# Patient Record
Sex: FEMALE | Race: OTHER | HISPANIC OR LATINO | URBAN - METROPOLITAN AREA
[De-identification: names, ages, dates, MRNs, and addresses within clinical notes are randomized per-mention and may not be internally consistent; named-entity substitution may affect disease eponyms.]

---

## 2022-02-28 ENCOUNTER — EMERGENCY (EMERGENCY)
Facility: HOSPITAL | Age: 42
LOS: 1 days | Discharge: HOME | End: 2022-02-28
Attending: EMERGENCY MEDICINE
Payer: SELF-PAY

## 2022-02-28 VITALS
WEIGHT: 104.94 LBS | HEART RATE: 98 BPM | HEIGHT: 60 IN | RESPIRATION RATE: 18 BRPM | SYSTOLIC BLOOD PRESSURE: 105 MMHG | DIASTOLIC BLOOD PRESSURE: 48 MMHG | TEMPERATURE: 99 F | OXYGEN SATURATION: 95 %

## 2022-02-28 LAB
ALBUMIN SERPL ELPH-MCNC: 4 G/DL — SIGNIFICANT CHANGE UP (ref 3.3–5.2)
ALP SERPL-CCNC: 73 U/L — SIGNIFICANT CHANGE UP (ref 40–120)
ALT FLD-CCNC: 38 U/L — HIGH
ANION GAP SERPL CALC-SCNC: 11 MMOL/L — SIGNIFICANT CHANGE UP (ref 5–17)
AST SERPL-CCNC: 44 U/L — HIGH
BASOPHILS # BLD AUTO: 0.01 K/UL — SIGNIFICANT CHANGE UP (ref 0–0.2)
BASOPHILS NFR BLD AUTO: 0.1 % — SIGNIFICANT CHANGE UP (ref 0–2)
BILIRUB SERPL-MCNC: 0.3 MG/DL — LOW (ref 0.4–2)
BUN SERPL-MCNC: 18.1 MG/DL — SIGNIFICANT CHANGE UP (ref 8–20)
CALCIUM SERPL-MCNC: 8.3 MG/DL — LOW (ref 8.6–10.2)
CHLORIDE SERPL-SCNC: 102 MMOL/L — SIGNIFICANT CHANGE UP (ref 98–107)
CO2 SERPL-SCNC: 22 MMOL/L — SIGNIFICANT CHANGE UP (ref 22–29)
CREAT SERPL-MCNC: 0.36 MG/DL — LOW (ref 0.5–1.3)
EOSINOPHIL # BLD AUTO: 0.14 K/UL — SIGNIFICANT CHANGE UP (ref 0–0.5)
EOSINOPHIL NFR BLD AUTO: 1.6 % — SIGNIFICANT CHANGE UP (ref 0–6)
FLUAV AG NPH QL: SIGNIFICANT CHANGE UP
FLUBV AG NPH QL: SIGNIFICANT CHANGE UP
GLUCOSE SERPL-MCNC: 105 MG/DL — HIGH (ref 70–99)
HCG SERPL-ACNC: <4 MIU/ML — SIGNIFICANT CHANGE UP
HCT VFR BLD CALC: 36.8 % — SIGNIFICANT CHANGE UP (ref 34.5–45)
HGB BLD-MCNC: 12.5 G/DL — SIGNIFICANT CHANGE UP (ref 11.5–15.5)
IMM GRANULOCYTES NFR BLD AUTO: 0.3 % — SIGNIFICANT CHANGE UP (ref 0–1.5)
LIDOCAIN IGE QN: 50 U/L — SIGNIFICANT CHANGE UP (ref 22–51)
LYMPHOCYTES # BLD AUTO: 0.57 K/UL — LOW (ref 1–3.3)
LYMPHOCYTES # BLD AUTO: 6.5 % — LOW (ref 13–44)
MCHC RBC-ENTMCNC: 31.6 PG — SIGNIFICANT CHANGE UP (ref 27–34)
MCHC RBC-ENTMCNC: 34 GM/DL — SIGNIFICANT CHANGE UP (ref 32–36)
MCV RBC AUTO: 93.2 FL — SIGNIFICANT CHANGE UP (ref 80–100)
MONOCYTES # BLD AUTO: 0.45 K/UL — SIGNIFICANT CHANGE UP (ref 0–0.9)
MONOCYTES NFR BLD AUTO: 5.2 % — SIGNIFICANT CHANGE UP (ref 2–14)
NEUTROPHILS # BLD AUTO: 7.53 K/UL — HIGH (ref 1.8–7.4)
NEUTROPHILS NFR BLD AUTO: 86.3 % — HIGH (ref 43–77)
PLATELET # BLD AUTO: 252 K/UL — SIGNIFICANT CHANGE UP (ref 150–400)
POTASSIUM SERPL-MCNC: 3.9 MMOL/L — SIGNIFICANT CHANGE UP (ref 3.5–5.3)
POTASSIUM SERPL-SCNC: 3.9 MMOL/L — SIGNIFICANT CHANGE UP (ref 3.5–5.3)
PROT SERPL-MCNC: 7 G/DL — SIGNIFICANT CHANGE UP (ref 6.6–8.7)
RBC # BLD: 3.95 M/UL — SIGNIFICANT CHANGE UP (ref 3.8–5.2)
RBC # FLD: 11.9 % — SIGNIFICANT CHANGE UP (ref 10.3–14.5)
RSV RNA NPH QL NAA+NON-PROBE: SIGNIFICANT CHANGE UP
SARS-COV-2 RNA SPEC QL NAA+PROBE: SIGNIFICANT CHANGE UP
SODIUM SERPL-SCNC: 134 MMOL/L — LOW (ref 135–145)
WBC # BLD: 8.73 K/UL — SIGNIFICANT CHANGE UP (ref 3.8–10.5)
WBC # FLD AUTO: 8.73 K/UL — SIGNIFICANT CHANGE UP (ref 3.8–10.5)

## 2022-02-28 PROCEDURE — 99284 EMERGENCY DEPT VISIT MOD MDM: CPT

## 2022-02-28 PROCEDURE — 99053 MED SERV 10PM-8AM 24 HR FAC: CPT

## 2022-02-28 PROCEDURE — 87637 SARSCOV2&INF A&B&RSV AMP PRB: CPT

## 2022-02-28 PROCEDURE — 85025 COMPLETE CBC W/AUTO DIFF WBC: CPT

## 2022-02-28 PROCEDURE — 99285 EMERGENCY DEPT VISIT HI MDM: CPT | Mod: 25

## 2022-02-28 PROCEDURE — 96374 THER/PROPH/DIAG INJ IV PUSH: CPT

## 2022-02-28 PROCEDURE — 36415 COLL VENOUS BLD VENIPUNCTURE: CPT

## 2022-02-28 PROCEDURE — 80053 COMPREHEN METABOLIC PANEL: CPT

## 2022-02-28 PROCEDURE — 96361 HYDRATE IV INFUSION ADD-ON: CPT

## 2022-02-28 PROCEDURE — 83690 ASSAY OF LIPASE: CPT

## 2022-02-28 PROCEDURE — 96375 TX/PRO/DX INJ NEW DRUG ADDON: CPT

## 2022-02-28 PROCEDURE — 84702 CHORIONIC GONADOTROPIN TEST: CPT

## 2022-02-28 RX ORDER — SODIUM CHLORIDE 9 MG/ML
1000 INJECTION INTRAMUSCULAR; INTRAVENOUS; SUBCUTANEOUS ONCE
Refills: 0 | Status: COMPLETED | OUTPATIENT
Start: 2022-02-28 | End: 2022-02-28

## 2022-02-28 RX ORDER — ONDANSETRON 8 MG/1
4 TABLET, FILM COATED ORAL ONCE
Refills: 0 | Status: COMPLETED | OUTPATIENT
Start: 2022-02-28 | End: 2022-02-28

## 2022-02-28 RX ORDER — DIPHENHYDRAMINE HCL 50 MG
50 CAPSULE ORAL ONCE
Refills: 0 | Status: COMPLETED | OUTPATIENT
Start: 2022-02-28 | End: 2022-02-28

## 2022-02-28 RX ADMIN — ONDANSETRON 4 MILLIGRAM(S): 8 TABLET, FILM COATED ORAL at 01:59

## 2022-02-28 RX ADMIN — Medication 50 MILLIGRAM(S): at 01:59

## 2022-02-28 RX ADMIN — Medication 125 MILLIGRAM(S): at 01:59

## 2022-02-28 RX ADMIN — SODIUM CHLORIDE 1000 MILLILITER(S): 9 INJECTION INTRAMUSCULAR; INTRAVENOUS; SUBCUTANEOUS at 01:59

## 2022-02-28 RX ADMIN — SODIUM CHLORIDE 1000 MILLILITER(S): 9 INJECTION INTRAMUSCULAR; INTRAVENOUS; SUBCUTANEOUS at 04:04
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
## 2022-02-28 NOTE — ED PROVIDER NOTE - PATIENT PORTAL LINK FT
You can access the FollowMyHealth Patient Portal offered by [facility] by registering at the following website: [email or web address]. By joining [employer]’s FollowMyHealth portal, you will also be able to view your health information using other applications (apps) compatible with our system.

## 2022-02-28 NOTE — ED ADULT NURSE NOTE - OBJECTIVE STATEMENT
Patient A&Ox4 complaining of abdominal pain and nausea x 1 day with diarrhea.  After administering zofran, pt developed hives on face, denies SOB, no swelling noted to tongue.  Airway patent. NIGEL Porter called to bedside for eval.  Benadryl and solumedrol given as per verbal order.  NAD noted, respirations even and unlabored.  Safety precautions in place.  Plan of care explained, pt verbalized understanding.
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
## 2022-02-28 NOTE — ED PROVIDER NOTE - OBJECTIVE STATEMENT
pt is a 41 y/o female with no pmhx presenting to the ed for evaluation. pt reports she started one day with diffuse abdominal cramping, nausea, episodes of watery diarrhea. pt states recently her sister had the same symptoms was told she has a "virus". pt denies cp sob fever cough dysuria back pain headache neck pain numbness or loss of sensation

## 2022-02-28 NOTE — ED ADULT NURSE REASSESSMENT NOTE - NS ED NURSE REASSESS COMMENT FT1
Hives on face have decreased in size, pt no longer complaining of itchiness, airway patent. NAD noted, respirations even and unlabored.  Safety precautions in place.  Plan of care explained, pt verbalized understanding.

## 2022-02-28 NOTE — ED ADULT TRIAGE NOTE - NS ED TRIAGE AVPU SCALE
Alert-The patient is alert, awake and responds to voice. The patient is oriented to time, place, and person. The triage nurse is able to obtain subjective information.
immune

## 2022-02-28 NOTE — ED PROVIDER NOTE - PHYSICAL EXAMINATION

## 2022-11-23 ENCOUNTER — EMERGENCY (EMERGENCY)
Facility: HOSPITAL | Age: 42
LOS: 1 days | Discharge: HOME | End: 2022-11-23
Attending: STUDENT IN AN ORGANIZED HEALTH CARE EDUCATION/TRAINING PROGRAM | Admitting: STUDENT IN AN ORGANIZED HEALTH CARE EDUCATION/TRAINING PROGRAM
Payer: MEDICAID

## 2022-11-23 VITALS
OXYGEN SATURATION: 96 % | TEMPERATURE: 98 F | DIASTOLIC BLOOD PRESSURE: 49 MMHG | RESPIRATION RATE: 18 BRPM | HEART RATE: 69 BPM | SYSTOLIC BLOOD PRESSURE: 109 MMHG

## 2022-11-23 PROCEDURE — 99282 EMERGENCY DEPT VISIT SF MDM: CPT

## 2022-11-23 NOTE — ED PROVIDER NOTE - PHYSICAL EXAMINATION
Gen: No acute distress, non toxic  HEENT: Mucous membranes moist, pink conjunctivae, EOMI. PERRL. Airway patent.   CV: RRR, nl s1/s2.  Resp: CTAB, normal rate and effort. No wheezes, rhonchi or crackles.   GI: Abdomen soft, NT, ND. No rebound, no guarding.  Neuro: A&O x4, MAEx4. 5/5 str ext x 4. Sensation intact, symmetric throughout. No FND's. Gait intact. CN 2-12 intact.   MSK: FROM LE b/l. compartments soft/compressible. FWB and ambulating.   Skin: No rashes, skin intact and well perfused. cap refill <2sec.   Vascular: Radial and dorsalis pedal pulses 2+ b/l. No calf ttp or swelling. No LE edema.

## 2022-11-23 NOTE — ED PROVIDER NOTE - NSFOLLOWUPINSTRUCTIONS_ED_ALL_ED_FT
- Please follow-up with your primary care doctor in the next 1-2 days.  Please call tomorrow for an appointment.  If you cannot follow-up with your primary care doctor please return to the ED for any urgent issues.  - You were given a copy of the tests performed today.  Please bring the results with you and review them with your primary care doctor.  - If you have any worsening of symptoms or any other concerns please return to the ED immediately.  - Please continue taking your home medications as directed.     - Realice un seguimiento con nash médico de atención primaria en los próximos 1 o 2 ever. Por favor llame mañana para betty abiola. Si no puede hacer un seguimiento con nash médico de atención primaria, regrese al servicio de urgencias para cualquier problema urgente.  - Se le entregó betty copia de las pruebas realizadas hoy. Traiga los resultados con usted y revíselos con nash médico de atención primaria.  - Si tiene algún empeoramiento de los síntomas o cualquier otra inquietud, regrese al servicio de urgencias de inmediato.  - Continúe tomando manda medicamentos en el hogar según las indicaciones.

## 2022-11-23 NOTE — ED PROVIDER NOTE - ATTENDING APP SHARED VISIT CONTRIBUTION OF CARE
Pt states that she has a 20 y hx of chills to bilateral lower legs. Pt has an appt to get this worked up as an outpatient in 2 months but came to the hospital tonight. no different today.  Pt neurovascularly intact. Pt instructed to keep outpatient appt.

## 2022-11-23 NOTE — ED ADULT TRIAGE NOTE - CHIEF COMPLAINT QUOTE
Pt ambulated into ED with steady gait c/o b/l leg pain "from the knees down." Pt states that she has had this pain for year and when asked if the pain has changed, states "no, but I can't sleep." Denies injury. JOSE.

## 2022-11-23 NOTE — ED PROVIDER NOTE - CLINICAL SUMMARY MEDICAL DECISION MAKING FREE TEXT BOX
43 yo female with no pmhx presents with b/l "sensation of coldness in the legs" for >20 yrs. pt states she came here tonight bc hasn't been able to get f/u with ortho until january. reached out to analia caputo . neurovascularly intact, no FND's, skin intact, nl color and temperature. strict return precautions explained.

## 2022-11-23 NOTE — ED PROVIDER NOTE - OBJECTIVE STATEMENT
43 yo female with no pmhx presents with b/l "sensation of coldness in the legs" for >20 yrs. Pt states for more than 20 yrs she has experiencing, a sensation of chills and coldness from below the knees to the ankles. Denies any trauma or falls in the past or currently. Unsure of triggering factors. Pt states because the inside of her legs feel cold, states she gets pain intermittently for >20 yrs. States that the pain/ "chill in her legs" worsen after standing on her legs all day. Denies paresthesias in the extremities, changes in color to the extremities, rashes. Pt states she came here tonight because wasn't sure what to do because went to her PCP for this wks ago, was told to f/u with an orthopedic but cannot get an appointment until January so she wanted to see if there was anything we can do for her. Denies fever, chills, body aches, dizziness, LOC, visual changes, CP, SOB, abd pain, N/V, paresthesias in the extremities, rashes, LE edema, weakness.

## 2022-11-23 NOTE — ED PROVIDER NOTE - NS ED ROS FT
Gen: denies fever  Skin: denies rashes  HEENT: denies visual changes  Respiratory: denies SOB  Cardiovascular: denies chest pain, LE edema  GI: denies abdominal pain, n/v/d  : denies dysuria, frequency  MSK: +sensation of coolness to the lower extremities. denies joint swelling/pain, back pain, neck pain  Neuro: denies headache, dizziness, LOC, weakness, numbness

## 2022-11-23 NOTE — ED PROVIDER NOTE - CARE PROVIDER_API CALL
Jonathon Tariq)  Orthopedics  [address] [place] [id number]  Phone: [phone]  Fax: [phone]  Follow Up Time:

## 2022-11-29 PROBLEM — Z78.9 OTHER SPECIFIED HEALTH STATUS: Chronic | Status: ACTIVE | Noted: 2022-11-23

## 2022-12-14 ENCOUNTER — RESULT REVIEW (OUTPATIENT)
Age: 42
End: 2022-12-14

## 2022-12-14 ENCOUNTER — APPOINTMENT (OUTPATIENT)
Dept: RHEUMATOLOGY | Facility: CLINIC | Age: 42
End: 2022-12-14

## 2022-12-14 VITALS
HEIGHT: 62 IN | SYSTOLIC BLOOD PRESSURE: 100 MMHG | OXYGEN SATURATION: 99 % | BODY MASS INDEX: 21.53 KG/M2 | WEIGHT: 117 LBS | TEMPERATURE: 97.7 F | HEART RATE: 82 BPM | DIASTOLIC BLOOD PRESSURE: 65 MMHG

## 2022-12-14 DIAGNOSIS — Z72.0 TOBACCO USE: ICD-10-CM

## 2022-12-14 DIAGNOSIS — Z00.00 ENCOUNTER FOR GENERAL ADULT MEDICAL EXAMINATION W/OUT ABNORMAL FINDINGS: ICD-10-CM

## 2022-12-14 DIAGNOSIS — Z83.3 FAMILY HISTORY OF DIABETES MELLITUS: ICD-10-CM

## 2022-12-14 PROCEDURE — 99205 OFFICE O/P NEW HI 60 MIN: CPT

## 2022-12-14 NOTE — HISTORY OF PRESENT ILLNESS
[FreeTextEntry1] : 42 year old female with PMH as listed below presents today for an initial evaluation\par \par Reports to have chronic hx of pain to BL legs that is getting progressively worse over the last few years. \par No swelling to the legs. \par \par Pain is worse at night, while working, after activity. Feels like a "cramping sensation". No weakness. No rashes\par Taking Tylenol/ NSAID prn for pain with relief of symptoms but pain returns soon after. \par Denies claudication. \par \par denies fever, chills, weight loss, weight gain, fatigue, malaise, denies dry eye,eye pain, eye redness, vision changes, dry mouth, oral ulcers, nasal congestion, difficulty swallowing,  denies ear pain, hearing changes, denies chest pain, chest palpitations, denies sob, denies nausea, vomiting, abdominal pain, diarrhea, constipation, blood in stool, denies dysuria, blood in the urine, denies rashes, photosensitivity, hair loss, skin thickening, denies blood clots,  raynauds\par \par FH: denies FH of autoimmune disease

## 2022-12-14 NOTE — ASSESSMENT
[FreeTextEntry1] : Pain to BL legs\par \par ROS and PE otherwise unremarkable for underlying classic CTD/ systemic autoimmune disease\par Very concerned about having autoimmune disease today\par \par - labs and imaging as below\par - NSAIDS/Tylenol prn for pain \par - OTC topical analgesics\par \par Discussed treatment plan with the patient. The patient was given the opportunity to ask questions and all questions were answered to their satisfaction.

## 2022-12-14 NOTE — PHYSICAL EXAM
[General Appearance - Alert] : alert [General Appearance - In No Acute Distress] : in no acute distress [General Appearance - Well Nourished] : well nourished [General Appearance - Well Developed] : well developed [Sclera] : the sclera and conjunctiva were normal [Outer Ear] : the ears and nose were normal in appearance [Neck Appearance] : the appearance of the neck was normal [Musculoskeletal - Swelling] : no joint swelling seen [] : no rash [No Focal Deficits] : no focal deficits [Oriented To Time, Place, And Person] : oriented to person, place, and time [FreeTextEntry1] : +slight tenderness to BL legs on palpation

## 2022-12-14 NOTE — REASON FOR VISIT
[Initial Evaluation] : an initial evaluation [[employer] ] : provided by [employer]   [Interpreters_IDNumber] : [id number] [Interpreters_FullName] : Cailin [TWNoteComboBox1] : [place]

## 2022-12-22 ENCOUNTER — OUTPATIENT (OUTPATIENT)
Dept: OUTPATIENT SERVICES | Facility: HOSPITAL | Age: 42
LOS: 1 days | End: 2022-12-22

## 2022-12-22 DIAGNOSIS — M79.604 PAIN IN RIGHT LEG: ICD-10-CM

## 2022-12-22 PROCEDURE — 73590 X-RAY EXAM OF LOWER LEG: CPT | Mod: 26,50

## 2023-01-03 ENCOUNTER — APPOINTMENT (OUTPATIENT)
Dept: RHEUMATOLOGY | Facility: CLINIC | Age: 43
End: 2023-01-03
Payer: MEDICAID

## 2023-01-03 VITALS
OXYGEN SATURATION: 98 % | TEMPERATURE: 98.2 F | HEART RATE: 56 BPM | DIASTOLIC BLOOD PRESSURE: 72 MMHG | SYSTOLIC BLOOD PRESSURE: 124 MMHG | HEIGHT: 62 IN

## 2023-01-03 DIAGNOSIS — M79.604 PAIN IN RIGHT LEG: ICD-10-CM

## 2023-01-03 DIAGNOSIS — M79.605 PAIN IN RIGHT LEG: ICD-10-CM

## 2023-01-03 PROCEDURE — 99215 OFFICE O/P EST HI 40 MIN: CPT

## 2023-01-03 RX ORDER — ERGOCALCIFEROL 1.25 MG/1
1.25 MG CAPSULE, LIQUID FILLED ORAL
Qty: 12 | Refills: 0 | Status: ACTIVE | COMMUNITY
Start: 2023-01-03 | End: 1900-01-01

## 2023-01-04 NOTE — ASSESSMENT
[FreeTextEntry1] : Pain to BL legs- unclear etiology\par \par ROS, PE, labs, imaging otherwise unremarkable for underlying classic CTD/ systemic autoimmune disease\par \par - PT\par - NSAIDS/Tylenol prn for pain \par - OTC topical analgesics\par - consider trial with muscle relaxer\par - vitamin d supplementation\par \par Discussed treatment plan with the patient. The patient was given the opportunity to ask questions and all questions were answered to their satisfaction.

## 2023-01-04 NOTE — HISTORY OF PRESENT ILLNESS
[FreeTextEntry1] : Pt presenting today for a f.u visit for joint pain. \par Last seen 12/2022. Chart reviewed since LV. \par Labs from 12/2022 reviewed with pt in detail- has vitamin d insufficiency, otherwise unremarkable. \par Imaging studies reviewed- unremarkable. \par Continues to experience pain to BL legs. No swelling to the legs. \par Pain is worse at night, while working, after activity. Feels like a "cramping sensation". No weakness. No rashes. Denies numbness/tingling sensation. \par Taking Tylenol/ NSAID prn for pain with relief of symptoms but pain returns soon after. \par Denies claudication. \par ROS otherwise unchanged from LV. \par denies fever, chills, oral ulcers,chest pain, chest palpitations, denies sob, denies nausea, vomiting, abdominal pain, denies rashes, photosensitivity, blood clots

## 2023-02-09 ENCOUNTER — APPOINTMENT (OUTPATIENT)
Dept: PHYSICAL MEDICINE AND REHAB | Facility: CLINIC | Age: 43
End: 2023-02-09

## 2023-03-15 ENCOUNTER — APPOINTMENT (OUTPATIENT)
Dept: RHEUMATOLOGY | Facility: CLINIC | Age: 43
End: 2023-03-15

## 2023-12-11 ENCOUNTER — EMERGENCY (EMERGENCY)
Facility: HOSPITAL | Age: 43
LOS: 1 days | Discharge: HOME | End: 2023-12-11
Attending: EMERGENCY MEDICINE
Payer: COMMERCIAL

## 2023-12-11 VITALS
RESPIRATION RATE: 18 BRPM | OXYGEN SATURATION: 98 % | DIASTOLIC BLOOD PRESSURE: 69 MMHG | HEART RATE: 74 BPM | SYSTOLIC BLOOD PRESSURE: 104 MMHG | TEMPERATURE: 98 F

## 2023-12-11 VITALS
DIASTOLIC BLOOD PRESSURE: 72 MMHG | HEART RATE: 87 BPM | OXYGEN SATURATION: 99 % | SYSTOLIC BLOOD PRESSURE: 119 MMHG | RESPIRATION RATE: 18 BRPM | TEMPERATURE: 98 F

## 2023-12-11 PROCEDURE — 96372 THER/PROPH/DIAG INJ SC/IM: CPT

## 2023-12-11 PROCEDURE — 99284 EMERGENCY DEPT VISIT MOD MDM: CPT

## 2023-12-11 PROCEDURE — 73030 X-RAY EXAM OF SHOULDER: CPT

## 2023-12-11 PROCEDURE — 73030 X-RAY EXAM OF SHOULDER: CPT | Mod: 26,LT

## 2023-12-11 PROCEDURE — T1013: CPT

## 2023-12-11 RX ORDER — IBUPROFEN 200 MG
1 TABLET ORAL
Qty: 15 | Refills: 0
Start: 2023-12-11 | End: 2023-12-15

## 2023-12-11 RX ORDER — KETOROLAC TROMETHAMINE 30 MG/ML
15 SYRINGE (ML) INJECTION ONCE
Refills: 0 | Status: DISCONTINUED | OUTPATIENT
Start: 2023-12-11 | End: 2023-12-11

## 2023-12-11 RX ORDER — LIDOCAINE 4 G/100G
1 CREAM TOPICAL
Qty: 10 | Refills: 0
Start: 2023-12-11 | End: 2023-12-20

## 2023-12-11 RX ORDER — METHOCARBAMOL 500 MG/1
750 TABLET, FILM COATED ORAL ONCE
Refills: 0 | Status: COMPLETED | OUTPATIENT
Start: 2023-12-11 | End: 2023-12-11

## 2023-12-11 RX ORDER — METHOCARBAMOL 500 MG/1
1 TABLET, FILM COATED ORAL
Qty: 10 | Refills: 0
Start: 2023-12-11 | End: 2023-12-13

## 2023-12-11 RX ADMIN — Medication 15 MILLIGRAM(S): at 21:20

## 2023-12-11 RX ADMIN — METHOCARBAMOL 750 MILLIGRAM(S): 500 TABLET, FILM COATED ORAL at 21:21

## 2023-12-11 NOTE — ED ADULT NURSE NOTE - NS_SISCREENINGSR_GEN_ALL_ED
· Continue oral Levaquin 3 more days, influenza/RSV (-)   · Continue treatment for acute COPD exacerbation as below, related to pneumonia  · Clinically improved, tachycardia better Negative

## 2023-12-11 NOTE — ED ADULT TRIAGE NOTE - NS ED NURSE AMBULANCES
[facility]
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
## 2023-12-11 NOTE — ED PROVIDER NOTE - NSFOLLOWUPINSTRUCTIONS_ED_ALL_ED_FT
Continue with medication as prescribed  Follow-up with primary care provider  Follow-up with [facility] Continue with medication as prescribed  Follow-up with primary care provider  Follow-up with [facility]

## 2023-12-11 NOTE — ED ADULT NURSE NOTE - NSFALLUNIVINTERV_ED_ALL_ED
Bed/Stretcher in lowest position, wheels locked, appropriate side rails in place/Call bell, personal items and telephone in reach/Instruct patient to call for assistance before getting out of bed/chair/stretcher/Non-slip footwear applied when patient is off stretcher/[place] to call system/Physically safe environment - no spills, clutter or unnecessary equipment/Purposeful proactive rounding/Room/bathroom lighting operational, light cord in reach Bed/Stretcher in lowest position, wheels locked, appropriate side rails in place/Call bell, personal items and telephone in reach/Instruct patient to call for assistance before getting out of bed/chair/stretcher/Non-slip footwear applied when patient is off stretcher/[place] to call system/Physically safe environment - no spills, clutter or unnecessary equipment/Purposeful proactive rounding/Room/bathroom lighting operational, light cord in reach
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
## 2023-12-11 NOTE — ED PROVIDER NOTE - PATIENT PORTAL LINK FT
You can access the FollowMyHealth Patient Portal offered by [facility] by registering at the following website: [email or web address]. By joining [employer]’s FollowMyHealth portal, you will also be able to view your health information using other applications (apps) compatible with our system. You can access the FollowMyHealth Patient Portal offered by [facility] by registering at the following website: [email or web address]. By joining [employer]’s FollowMyHealth portal, you will also be able to view your health information using other applications (apps) compatible with our system.
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
## 2023-12-15 ENCOUNTER — EMERGENCY (EMERGENCY)
Facility: HOSPITAL | Age: 43
LOS: 1 days | Discharge: HOME | End: 2023-12-15
Attending: STUDENT IN AN ORGANIZED HEALTH CARE EDUCATION/TRAINING PROGRAM
Payer: COMMERCIAL

## 2023-12-15 VITALS
RESPIRATION RATE: 18 BRPM | TEMPERATURE: 98 F | HEART RATE: 79 BPM | DIASTOLIC BLOOD PRESSURE: 62 MMHG | OXYGEN SATURATION: 99 % | SYSTOLIC BLOOD PRESSURE: 96 MMHG

## 2023-12-15 VITALS
SYSTOLIC BLOOD PRESSURE: 105 MMHG | TEMPERATURE: 98 F | HEART RATE: 89 BPM | OXYGEN SATURATION: 98 % | WEIGHT: 121.92 LBS | RESPIRATION RATE: 16 BRPM | DIASTOLIC BLOOD PRESSURE: 70 MMHG

## 2023-12-15 PROCEDURE — 99283 EMERGENCY DEPT VISIT LOW MDM: CPT

## 2023-12-15 PROCEDURE — 96372 THER/PROPH/DIAG INJ SC/IM: CPT

## 2023-12-15 PROCEDURE — T1013: CPT

## 2023-12-15 RX ORDER — ACETAMINOPHEN 500 MG
975 TABLET ORAL ONCE
Refills: 0 | Status: COMPLETED | OUTPATIENT
Start: 2023-12-15 | End: 2023-12-15

## 2023-12-15 RX ORDER — KETOROLAC TROMETHAMINE 30 MG/ML
30 SYRINGE (ML) INJECTION ONCE
Refills: 0 | Status: DISCONTINUED | OUTPATIENT
Start: 2023-12-15 | End: 2023-12-15

## 2023-12-15 RX ADMIN — Medication 975 MILLIGRAM(S): at 14:27

## 2023-12-15 RX ADMIN — Medication 30 MILLIGRAM(S): at 14:27

## 2023-12-15 NOTE — ED PROVIDER NOTE - PATIENT PORTAL LINK FT
You can access the FollowMyHealth Patient Portal offered by [facility] by registering at the following website: [email or web address]. By joining [employer]’s FollowMyHealth portal, you will also be able to view your health information using other applications (apps) compatible with our system. You can access the FollowMyHealth Patient Portal offered by [facility] by registering at the following website: [email or web address]. By joining [employer]’s FollowMyHealth portal, you will also be able to view your health information using other applications (apps) compatible with our system.

## 2023-12-15 NOTE — ED ADULT TRIAGE NOTE - GLASGOW COMA SCALE: BEST VERBAL RESPONSE, MLM
6/14/2019    Ari Humphries  [address] [facility] [address]        Dear Ari Humphries :    I have been unable to contact you by phone to inform you of your recent test results so I am sending you this letter.    Please call the nurse line at [phone] to discuss your results and any questions that you may have.  Thank you.    Sincerely,        Cyn Thornton CNM / Rupal Marino, RN    [facility]  [facility]  [address]  [place]  [address]  T [phone]  F [phone]  [email or web address]  
(V5) oriented

## 2023-12-15 NOTE — ED ADULT NURSE NOTE - NSFALLUNIVINTERV_ED_ALL_ED
Bed/Stretcher in lowest position, wheels locked, appropriate side rails in place/Call bell, personal items and telephone in reach/Instruct patient to call for assistance before getting out of bed/chair/stretcher/Non-slip footwear applied when patient is off stretcher/[place] to call system/Physically safe environment - no spills, clutter or unnecessary equipment/Purposeful proactive rounding/Room/bathroom lighting operational, light cord in reach Bed/Stretcher in lowest position, wheels locked, appropriate side rails in place/Call bell, personal items and telephone in reach/Instruct patient to call for assistance before getting out of bed/chair/stretcher/Non-slip footwear applied when patient is off stretcher/[place] to call system/Physically safe environment - no spills, clutter or unnecessary equipment/Purposeful proactive rounding/Room/bathroom lighting operational, light cord in reach

## 2023-12-15 NOTE — ED ADULT NURSE NOTE - CAS DISCH CONDITION
Patient was informed of the advised and has no further questions at this time.  
Please let Henny know that I have reviewed the CT scan results and there are no suspicious findings or significant abnormalities. There was a comment about ovary cyst most likely due to recent ovulation, and felt to be a normal finding.   
Stable

## 2023-12-15 NOTE — ED PROVIDER NOTE - PHYSICAL EXAMINATION
Gen: No acute distress, non toxic  Head: NCAT  Eyes: pink conjunctivae, EOMI, PERRL  Neuro: A&O x 3, sensorimotor intact without deficits, 5/5  str b/l  MSK: Full ROM LUE with pain on active and passive shoulder flexion and abduction. +Mild ttp left anterior shoulder. No midline c-spine ttp or step offs.   Skin: No rashes. intact and perfused.

## 2023-12-15 NOTE — ED ADULT NURSE NOTE - CHPI ED NUR SYMPTOMS POS
-- DO NOT REPLY / DO NOT REPLY ALL --  -- Message is from the [facility]--    COVID-19 Universal Screening: N/A - Not about scheduling    General Patient Message      Reason for Call: Patient called in to return call back to Dr Durbin office regarding test results     Caller Information       Type Contact Phone    04/30/2021 10:24 AM CDT Phone (Incoming) Reji Mcginnis (Self) [phone] (M)          Alternative phone number: None    Turnaround time given to caller:   \"This message will be sent to [state Provider's name]. The clinical team will fulfill your request as soon as they review your message.\"    
Spoke with patient, he is informed of lab results.  
PAIN

## 2023-12-15 NOTE — ED PROVIDER NOTE - CARE PROVIDERS DIRECT ADDRESSES
[email or web address]
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
## 2023-12-15 NOTE — ED PROVIDER NOTE - NS ED ROS FT
Gen: denies fever, chills, fatigue, weight loss  Skin: denies rashes, laceration, bruising  HEENT: denies visual changes, ear pain, nasal congestion, throat pain  Respiratory: denies CRUZ, SOB, cough, wheezing  Cardiovascular: denies chest pain, palpitations, diaphoresis, LE edema  GI: denies abdominal pain, n/v/d  : denies dysuria, frequency, urgency, bowel/bladder incontinence  MSK: +Left shoulder pain. Denies back pain, neck pain  Neuro: denies headache, dizziness, weakness, numbness

## 2023-12-15 NOTE — ED PROVIDER NOTE - CARE PROVIDER_API CALL
Jimbo Olsen  Orthopaedic Surgery  [address] [place] [id number]  Phone: [phone]  Fax: [phone]  Follow Up Time:    Jimbo Olsen  Orthopaedic Surgery  [address] [place] [id number]  Phone: [phone]  Fax: [phone]  Follow Up Time:

## 2024-06-14 NOTE — ED ADULT NURSE NOTE - NS ED NURSE LEVEL OF CONSCIOUSNESS AFFECT
Urgent PA initiated on CMM. (Key: [id number])    If [employer] has not responded in 1-3 days or if you have any questions about your ePA request, please contact [facility] at [phone]     Awaiting determination   Calm/Appropriate

## 2024-09-29 ENCOUNTER — EMERGENCY (EMERGENCY)
Facility: HOSPITAL | Age: 44
LOS: 1 days | Discharge: HOME | End: 2024-09-29
Attending: STUDENT IN AN ORGANIZED HEALTH CARE EDUCATION/TRAINING PROGRAM
Payer: COMMERCIAL

## 2024-09-29 ENCOUNTER — EMERGENCY (EMERGENCY)
Facility: HOSPITAL | Age: 44
LOS: 1 days | Discharge: HOME | End: 2024-09-29
Attending: EMERGENCY MEDICINE
Payer: COMMERCIAL

## 2024-09-29 VITALS
WEIGHT: 124.78 LBS | DIASTOLIC BLOOD PRESSURE: 45 MMHG | RESPIRATION RATE: 18 BRPM | HEART RATE: 78 BPM | SYSTOLIC BLOOD PRESSURE: 95 MMHG | TEMPERATURE: 98 F | OXYGEN SATURATION: 98 %

## 2024-09-29 VITALS
OXYGEN SATURATION: 95 % | RESPIRATION RATE: 18 BRPM | SYSTOLIC BLOOD PRESSURE: 95 MMHG | HEART RATE: 84 BPM | DIASTOLIC BLOOD PRESSURE: 74 MMHG

## 2024-09-29 VITALS
RESPIRATION RATE: 20 BRPM | WEIGHT: 125 LBS | HEIGHT: 65 IN | HEART RATE: 79 BPM | SYSTOLIC BLOOD PRESSURE: 95 MMHG | DIASTOLIC BLOOD PRESSURE: 51 MMHG | TEMPERATURE: 98 F | OXYGEN SATURATION: 97 %

## 2024-09-29 LAB
ALBUMIN SERPL ELPH-MCNC: 4.3 G/DL — SIGNIFICANT CHANGE UP (ref 3.3–5.2)
ALP SERPL-CCNC: 72 U/L — SIGNIFICANT CHANGE UP (ref 40–120)
ALT FLD-CCNC: 17 U/L — SIGNIFICANT CHANGE UP
ANION GAP SERPL CALC-SCNC: 14 MMOL/L — SIGNIFICANT CHANGE UP (ref 5–17)
AST SERPL-CCNC: 27 U/L — SIGNIFICANT CHANGE UP
BASOPHILS # BLD AUTO: 0.02 K/UL — SIGNIFICANT CHANGE UP (ref 0–0.2)
BASOPHILS NFR BLD AUTO: 0.3 % — SIGNIFICANT CHANGE UP (ref 0–2)
BILIRUB SERPL-MCNC: 0.3 MG/DL — LOW (ref 0.4–2)
BUN SERPL-MCNC: 9.7 MG/DL — SIGNIFICANT CHANGE UP (ref 8–20)
CALCIUM SERPL-MCNC: 8.7 MG/DL — SIGNIFICANT CHANGE UP (ref 8.4–10.5)
CHLORIDE SERPL-SCNC: 103 MMOL/L — SIGNIFICANT CHANGE UP (ref 96–108)
CO2 SERPL-SCNC: 21 MMOL/L — LOW (ref 22–29)
CREAT SERPL-MCNC: 0.42 MG/DL — LOW (ref 0.5–1.3)
EGFR: 125 ML/MIN/1.73M2 — SIGNIFICANT CHANGE UP
EGFR: 125 ML/MIN/1.73M2 — SIGNIFICANT CHANGE UP
EOSINOPHIL # BLD AUTO: 0.06 K/UL — SIGNIFICANT CHANGE UP (ref 0–0.5)
EOSINOPHIL NFR BLD AUTO: 0.9 % — SIGNIFICANT CHANGE UP (ref 0–6)
GLUCOSE SERPL-MCNC: 144 MG/DL — HIGH (ref 70–99)
HCG SERPL-ACNC: <4 MIU/ML — SIGNIFICANT CHANGE UP
HCT VFR BLD CALC: 37.3 % — SIGNIFICANT CHANGE UP (ref 34.5–45)
HGB BLD-MCNC: 12.3 G/DL — SIGNIFICANT CHANGE UP (ref 11.5–15.5)
IMM GRANULOCYTES NFR BLD AUTO: 0.3 % — SIGNIFICANT CHANGE UP (ref 0–0.9)
LYMPHOCYTES # BLD AUTO: 1.67 K/UL — SIGNIFICANT CHANGE UP (ref 1–3.3)
LYMPHOCYTES # BLD AUTO: 24.2 % — SIGNIFICANT CHANGE UP (ref 13–44)
MCHC RBC-ENTMCNC: 30.8 PG — SIGNIFICANT CHANGE UP (ref 27–34)
MCHC RBC-ENTMCNC: 33 GM/DL — SIGNIFICANT CHANGE UP (ref 32–36)
MCV RBC AUTO: 93.3 FL — SIGNIFICANT CHANGE UP (ref 80–100)
MONOCYTES # BLD AUTO: 0.53 K/UL — SIGNIFICANT CHANGE UP (ref 0–0.9)
MONOCYTES NFR BLD AUTO: 7.7 % — SIGNIFICANT CHANGE UP (ref 2–14)
NEUTROPHILS # BLD AUTO: 4.59 K/UL — SIGNIFICANT CHANGE UP (ref 1.8–7.4)
NEUTROPHILS NFR BLD AUTO: 66.6 % — SIGNIFICANT CHANGE UP (ref 43–77)
PLATELET # BLD AUTO: 267 K/UL — SIGNIFICANT CHANGE UP (ref 150–400)
POTASSIUM SERPL-MCNC: 3.5 MMOL/L — SIGNIFICANT CHANGE UP (ref 3.5–5.3)
POTASSIUM SERPL-SCNC: 3.5 MMOL/L — SIGNIFICANT CHANGE UP (ref 3.5–5.3)
PROT SERPL-MCNC: 7.4 G/DL — SIGNIFICANT CHANGE UP (ref 6.6–8.7)
RBC # BLD: 4 M/UL — SIGNIFICANT CHANGE UP (ref 3.8–5.2)
RBC # FLD: 12.6 % — SIGNIFICANT CHANGE UP (ref 10.3–14.5)
SODIUM SERPL-SCNC: 138 MMOL/L — SIGNIFICANT CHANGE UP (ref 135–145)
WBC # BLD: 6.89 K/UL — SIGNIFICANT CHANGE UP (ref 3.8–10.5)
WBC # FLD AUTO: 6.89 K/UL — SIGNIFICANT CHANGE UP (ref 3.8–10.5)

## 2024-09-29 PROCEDURE — 96374 THER/PROPH/DIAG INJ IV PUSH: CPT

## 2024-09-29 PROCEDURE — 70450 CT HEAD/BRAIN W/O DYE: CPT | Mod: MC

## 2024-09-29 PROCEDURE — 99284 EMERGENCY DEPT VISIT MOD MDM: CPT

## 2024-09-29 PROCEDURE — 99284 EMERGENCY DEPT VISIT MOD MDM: CPT | Mod: 25

## 2024-09-29 PROCEDURE — 80053 COMPREHEN METABOLIC PANEL: CPT

## 2024-09-29 PROCEDURE — 96375 TX/PRO/DX INJ NEW DRUG ADDON: CPT

## 2024-09-29 PROCEDURE — T1013: CPT

## 2024-09-29 PROCEDURE — 70450 CT HEAD/BRAIN W/O DYE: CPT | Mod: 26,MC

## 2024-09-29 PROCEDURE — 85025 COMPLETE CBC W/AUTO DIFF WBC: CPT

## 2024-09-29 PROCEDURE — 36415 COLL VENOUS BLD VENIPUNCTURE: CPT

## 2024-09-29 PROCEDURE — 84702 CHORIONIC GONADOTROPIN TEST: CPT

## 2024-09-29 RX ORDER — ACETAMINOPHEN 325 MG
1000 TABLET ORAL ONCE
Refills: 0 | Status: COMPLETED | OUTPATIENT
Start: 2024-09-29 | End: 2024-09-29

## 2024-09-29 RX ORDER — SODIUM CHLORIDE IRRIG SOLUTION 0.9 %
1000 SOLUTION, IRRIGATION IRRIGATION ONCE
Refills: 0 | Status: COMPLETED | OUTPATIENT
Start: 2024-09-29 | End: 2024-09-29

## 2024-09-29 RX ORDER — METOCLOPRAMIDE HCL 10 MG
5 TABLET ORAL ONCE
Refills: 0 | Status: COMPLETED | OUTPATIENT
Start: 2024-09-29 | End: 2024-09-29

## 2024-09-29 RX ORDER — ACETAMINOPHEN 500 MG/5ML
1000 LIQUID (ML) ORAL ONCE
Refills: 0 | Status: COMPLETED | OUTPATIENT
Start: 2024-09-29 | End: 2024-09-29

## 2024-09-29 RX ORDER — METOCLOPRAMIDE HCL 5 MG
10 TABLET ORAL ONCE
Refills: 0 | Status: COMPLETED | OUTPATIENT
Start: 2024-09-29 | End: 2024-09-29

## 2024-09-29 RX ORDER — KETOROLAC TROMETHAMINE 10 MG/1
15 TABLET, FILM COATED ORAL ONCE
Refills: 0 | Status: DISCONTINUED | OUTPATIENT
Start: 2024-09-29 | End: 2024-09-29

## 2024-09-29 RX ORDER — ACETAMINOPHEN 325 MG
2 TABLET ORAL
Qty: 60 | Refills: 0
Start: 2024-09-29 | End: 2024-10-03

## 2024-09-29 RX ADMIN — KETOROLAC TROMETHAMINE 15 MILLIGRAM(S): 10 TABLET, FILM COATED ORAL at 19:44

## 2024-09-29 RX ADMIN — Medication 400 MILLIGRAM(S): at 04:31

## 2024-09-29 RX ADMIN — Medication 1000 MILLILITER(S): at 04:29

## 2024-09-29 RX ADMIN — Medication 400 MILLIGRAM(S): at 19:44

## 2024-09-29 RX ADMIN — Medication 1000 MILLILITER(S): at 19:44

## 2024-09-29 RX ADMIN — Medication 10 MILLIGRAM(S): at 19:44

## 2024-09-29 RX ADMIN — Medication 102 MILLIGRAM(S): at 04:33

## 2024-09-29 NOTE — ED STATDOCS - NSFOLLOWUPINSTRUCTIONS_ED_ALL_ED_FT
- Ibuprofen 600mg every 6 hours as needed for pain.  - Acetaminophen 650mg every 6 hours as needed for pain.   - Avoid blue light (i.e. phone, TV).  - Please bring all documentation from your ED visit to any related future follow up appointment.  - Please call to schedule follow up appointment with your primary care physician within 24-48 hours.  - Please seek immediate medical attention for any new/worsening, signs/symptoms, or concerns.    Feel better!    - Ibuprofeno 600mg cada 6 horas según necesidad para el dolor.  - Acetaminofén 650 mg cada 6 horas según sea necesario para el dolor.   - Avoid blue light (i.e. phone, TV).  - Traiga toda la documentación de nash visita al servicio de urgencias a cualquier abiola de seguimiento futura relacionada.  - Llame para programar betty abiola de seguimiento con nash médico de atención primaria dentro de las 24 a 48 horas.  - Busque atención médica inmediata ante cualquier nuevo / empeoramiento, signos / síntomas o inquietudes.    ¡Sentirse mejor!       Dolor de adriana general sin causa    General Headache Without Cause      El dolor de adriana es un dolor o malestar que se siente en la jamey de la adriana o del brandi. Hay muchas causas y tipos de kofi de adriana. En algunos casos, es posible que no se encuentre la causa.      Siga estas indicaciones en nash casa:    Controle nash afección para detectar cualquier cambio. Infórmele a nash médico acerca de los cambios. Siga estos pasos para controlar nash afección:      Control del dolor                   •[place] los medicamentos de venta brenna y los recetados solamente iron se lo haya indicado el médico.      •Cuando sienta dolor de adriana acuéstese en un cuarto oscuro y tranquilo.    •Si se lo indican, aplíquese hielo en la adriana y en la jamey del brandi:  •Ponga el hielo en betty bolsa plástica.      •Coloque betty toalla entre la piel y la bolsa.      •Coloque el hielo guru 20 minutos, 2 a 3 veces al día.      •Si se lo indican, aplique calor en la jamey afectada. Use la cricket de calor que el médico le recomiende, iron betty compresa de calor húmedo o betty almohadilla térmica.   • Coloque betty toalla entre la piel y la cricket de calor.       •Aplique calor guru 20 a 30 minutos.       •Retire la cricket de calor si la piel se pone de color gomez brillante. Ada es muy importante si no puede sentir dolor, calor o frío. Puede correr un riesgo mayor de sufrir quemaduras.        •Mantenga las luces tenues si las luces brillantes le molestan o sugar kofi de adriana empeoran.      Comida y bebida     •Mantenga un horario para las comidas.    •Si sanket alcohol: •Limite la cantidad que sanket a lo siguiente:   •De 0 a 1 medida por día para las mujeres.       • De 0 a 2 medidas por día para los hombres.         •Esté atento a la cantidad de alcohol que hay en las bebidas que rosmery. En los [place], betty medida equivale a betty botella de cerveza de 12 oz (355 ml), un vaso de vino de 5 oz (148 ml) o un vaso de betty bebida alcohólica de mykel graduación de 1½ oz (44 ml).        •Deje de gerald cafeína o reduzca la cantidad que consume.        Indicaciones generales    •Lleve un registro diario para averiguar si ciertas cosas provocan los kofi de adriana. Registre, por ejemplo, lo siguiente:  •Lo que usted come y sanket.      •El tiempo que duerme.      •Algún cambio en nash dieta o en los medicamentos.        •Hágase masajes o pruebe otras formas de relajarse.      •Limite el estrés.      •Siéntese con la espalda recta. No contraiga (tensione) los músculos.      • No consuma ningún producto que contenga nicotina o tabaco. Estos incluyen los cigarrillos, el tabaco para mascar y los cigarrillos electrónicos. Si necesita ayuda para dejar de fumar, consulte al médico.      •Keyur ejercicios con regularidad nelson iron se lo indicó el médico.      •Duerma lo suficiente. Ada a menudo significa entre 7 y 9 horas de sueño cada noche.      •Concurra a todas las visitas de control iron se lo haya indicado el médico. Ada es importante.        Comuníquese con un médico si:    •Los medicamentos no logran aliviar los síntomas.      •Tiene un dolor de adriana que es diferente a los otros kofi de adriana.      •Tiene malestar estomacal (náuseas) o vomita.      •Tiene fiebre.        Solicite ayuda inmediatamente si:    •El dolor de adriana empeora rápidamente.      •El dolor empeora después de hacer mucha actividad física.      •Sigue vomitando.      •Presenta rigidez en el brandi.      •Tiene dificultad para natividad.      •Tiene dificultad para hablar.      •Siente dolor en el kelly o en el oído.      •Sugar músculos están débiles, o pierde el control muscular.      •Pierde el equilibrio o tiene problemas para caminar.      •Siente que va a desvanecerse (perder el conocimiento) o se desmaya.      •Está desorientado (confundido).      •Tiene betty convulsión.        Resumen    •El dolor de adriana es un dolor o malestar que se siente en la jamey de la adriana o del brandi.      •Hay muchas causas y tipos de kofi de adriana. En algunos casos, es posible que no se encuentre la causa.      •Lleve un diario iron ayuda para determinar la causa de los kofi de adriana. Controle nash afección para detectar cualquier cambio. Infórmele a nash médico acerca de los cambios.      •Comuníquese con un médico si tiene un dolor de adriana que es diferente de lo habitual o si el dolor de adriana no se renee con los medicamentos.      •Solicite ayuda de inmediato si el dolor de adriana es muy intenso, vomita, tiene dificultad para natividad, pierde el equilibrio o tiene betty convulsión.      Esta información no tiene iron fin reemplazar el consejo del médico. Asegúrese de hacerle al médico cualquier pregunta que tenga.

## 2024-09-29 NOTE — ED STATDOCS - NS ED ROS FT
CONSTITUTIONAL - no  fever, no diaphoresis, no weight change  SKIN - no rash  HEMATOLOGIC - no bleeding, no bruising  EYES - no eye pain, no blurred vision  ENT - no change in hearing, no pain  RESPIRATORY - no shortness of breath, no cough  CARDIAC - no chest pain, no palpitations  GI - no abd pain, no nausea, (+) vomiting, no diarrhea, no constipation, no bleeding  GENITO-URINARY - no discharge, no dysuria; no hematuria,   ENDO - no polydipsia, no polyuria, no heat/no cold intolerance  MUSCULOSKELETAL - no joint pain, no swelling, no redness  NEUROLOGIC - no weakness, (+)headache, no anesthesia, no paresthesias  PSYCH - no anxiety, non suicidal, non homicidal, no hallucination, no depression

## 2024-09-29 NOTE — ED ADULT TRIAGE NOTE - HEART RATE (BEATS/MIN)
1. Does the patient have a moderate to severe fever?  No  2. Has the patient had a serious reaction to a flu shot before?   No  3. Has the patient ever had Guillian [place] Syndrome within 6 weeks of a previous flu shot?  No  4. Is the patient less that 6 months of age?  No    Patient is eligible to receive the vaccine based on all questions being answered as 'No'.   79

## 2024-09-29 NOTE — ED STATDOCS - PROGRESS NOTE DETAILS
NIGEL Ji: Patient evaluated by intake physician. HPI/ROS/PE as noted above. Will follow up plan per intake physician and continue to assess patient. No OCP use.   Victor PA Garrison: Feeling better.

## 2024-09-29 NOTE — ED STATDOCS - PHYSICAL EXAMINATION
VITAL SIGNS: I have reviewed nursing notes and confirm.  CONSTITUTIONAL: (+)uncomfortable looking   SKIN: Skin exam is warm and dry, no acute rash.  HEAD: Normocephalic; atraumatic.  EYES: PERRL, EOM intact; conjunctiva and sclera clear.  ENT: No nasal discharge; airway clear. Throat clear.  NECK: Supple; non tender.    CARD: Regular rate and rhythm.  RESP: No wheezes,  no rales or rhonchi.   ABD:  soft; non-distended; non-tender;   EXT: Normal ROM. No clubbing, cyanosis or edema.  NEURO: Alert, oriented. Grossly unremarkable. No focal deficits.  moves all extremities,  normal gait   PSYCH: Cooperative, appropriate.

## 2024-09-29 NOTE — ED STATDOCS - CLINICAL SUMMARY MEDICAL DECISION MAKING FREE TEXT BOX
44-year-old female no past medical history presents with severe headache. patient was seen this morning around 5 AM for the same thing.  Patient reports taking over-the-counter enhancement medication for sexual performance when she developed the headache.  Patient had blood work and CT head done that was unremarkable.  Patient reports that she took ibuprofen 200 mg around 11 with no relief.  Patient report headache associated with nausea vomiting.  And return to the ED     no focal neurodeficit on exam.  Patient has not taking the appropriate dose of over-the-counter medication for headache.  Will treat with IV fluids, IV Tylenol, Toradol, Reglan.  P.o. challenge.  Patient educated on taking over-the-counter Motrin and Tylenol with appropriate dose.

## 2024-09-29 NOTE — ED STATDOCS - OBJECTIVE STATEMENT
44-year-old female no past medical history presents with severe headache. patient was seen this morning around 5 AM for the same thing.  Patient reports taking over-the-counter enhancement medication for sexual performance when she developed the headache.  Patient had blood work and CT head done that was unremarkable.  Patient reports that she took ibuprofen 200 mg around 11 with no relief.  Patient report headache associated with nausea vomiting.  And return to the ED.       Antonio

## 2024-09-29 NOTE — ED STATDOCS - PATIENT PORTAL LINK FT
You can access the FollowMyHealth Patient Portal offered by [facility] by registering at the following website: [email or web address]. By joining [facility]’s FollowMyHealth portal, you will also be able to view your health information using other applications (apps) compatible with our system.

## 2024-09-29 NOTE — ED ADULT NURSE NOTE - NSFALLUNIVINTERV_ED_ALL_ED
Bed/Stretcher in lowest position, wheels locked, appropriate side rails in place/Call bell, personal items and telephone in reach/Instruct patient to call for assistance before getting out of bed/chair/stretcher/Non-slip footwear applied when patient is off stretcher/[place] to call system/Physically safe environment - no spills, clutter or unnecessary equipment/Purposeful proactive rounding/Room/bathroom lighting operational, light cord in reach

## 2024-09-29 NOTE — ED STATDOCS - CARE PROVIDER_API CALL
Leoncio Guzman  Neurology  [address] [place] [address], [facility] [address]  [place] [address]  Phone: [phone]  Fax: [phone]  Follow Up Time:

## 2024-09-29 NOTE — ED STATDOCS - ATTENDING APP SHARED VISIT CONTRIBUTION OF CARE
I, Dima Blevins, performed the initial face to face bedside interview with this patient regarding history of present illness, review of symptoms and relevant past medical, social and family history.  I completed an independent physical examination.  I was the initial provider who evaluated this patient. I have signed out the follow up of any pending tests (i.e. labs, radiological studies) to the [facility].  I have communicated the patient’s plan of care and disposition with the [facility].

## 2024-10-15 ENCOUNTER — EMERGENCY (EMERGENCY)
Facility: HOSPITAL | Age: 44
LOS: 1 days | Discharge: HOME | End: 2024-10-15
Attending: EMERGENCY MEDICINE
Payer: MEDICAID

## 2024-10-15 VITALS
HEART RATE: 78 BPM | DIASTOLIC BLOOD PRESSURE: 69 MMHG | RESPIRATION RATE: 18 BRPM | OXYGEN SATURATION: 97 % | SYSTOLIC BLOOD PRESSURE: 112 MMHG

## 2024-10-15 VITALS
OXYGEN SATURATION: 97 % | WEIGHT: 120.15 LBS | TEMPERATURE: 97 F | DIASTOLIC BLOOD PRESSURE: 59 MMHG | HEIGHT: 65 IN | SYSTOLIC BLOOD PRESSURE: 106 MMHG | RESPIRATION RATE: 20 BRPM | HEART RATE: 83 BPM

## 2024-10-15 DIAGNOSIS — F43.21 ADJUSTMENT DISORDER WITH DEPRESSED MOOD: ICD-10-CM

## 2024-10-15 LAB
ALBUMIN SERPL ELPH-MCNC: 4.6 G/DL — SIGNIFICANT CHANGE UP (ref 3.3–5.2)
ALP SERPL-CCNC: 90 U/L — SIGNIFICANT CHANGE UP (ref 40–120)
ALT FLD-CCNC: 33 U/L — HIGH
AMPHET UR-MCNC: NEGATIVE — SIGNIFICANT CHANGE UP
ANION GAP SERPL CALC-SCNC: 13 MMOL/L — SIGNIFICANT CHANGE UP (ref 5–17)
APAP SERPL-MCNC: <3 UG/ML — LOW (ref 10–26)
APPEARANCE UR: CLEAR — SIGNIFICANT CHANGE UP
AST SERPL-CCNC: 37 U/L — HIGH
BACTERIA # UR AUTO: ABNORMAL /HPF
BARBITURATES UR SCN-MCNC: NEGATIVE — SIGNIFICANT CHANGE UP
BASOPHILS # BLD AUTO: 0.04 K/UL — SIGNIFICANT CHANGE UP (ref 0–0.2)
BASOPHILS NFR BLD AUTO: 0.6 % — SIGNIFICANT CHANGE UP (ref 0–2)
BENZODIAZ UR-MCNC: NEGATIVE — SIGNIFICANT CHANGE UP
BILIRUB SERPL-MCNC: 0.3 MG/DL — LOW (ref 0.4–2)
BILIRUB UR-MCNC: NEGATIVE — SIGNIFICANT CHANGE UP
BUN SERPL-MCNC: 13.9 MG/DL — SIGNIFICANT CHANGE UP (ref 8–20)
CALCIUM SERPL-MCNC: 9.2 MG/DL — SIGNIFICANT CHANGE UP (ref 8.4–10.5)
CHLORIDE SERPL-SCNC: 101 MMOL/L — SIGNIFICANT CHANGE UP (ref 96–108)
CO2 SERPL-SCNC: 23 MMOL/L — SIGNIFICANT CHANGE UP (ref 22–29)
COCAINE METAB.OTHER UR-MCNC: NEGATIVE — SIGNIFICANT CHANGE UP
COLOR SPEC: YELLOW — SIGNIFICANT CHANGE UP
CREAT SERPL-MCNC: 0.33 MG/DL — LOW (ref 0.5–1.3)
DIFF PNL FLD: ABNORMAL
EGFR: 133 ML/MIN/1.73M2 — SIGNIFICANT CHANGE UP
EOSINOPHIL # BLD AUTO: 0.08 K/UL — SIGNIFICANT CHANGE UP (ref 0–0.5)
EOSINOPHIL NFR BLD AUTO: 1.3 % — SIGNIFICANT CHANGE UP (ref 0–6)
ETHANOL SERPL-MCNC: <10 MG/DL — SIGNIFICANT CHANGE UP (ref 0–9)
FENTANYL UR QL SCN: NEGATIVE — SIGNIFICANT CHANGE UP
FLUAV AG NPH QL: SIGNIFICANT CHANGE UP
FLUBV AG NPH QL: SIGNIFICANT CHANGE UP
GLUCOSE SERPL-MCNC: 100 MG/DL — HIGH (ref 70–99)
GLUCOSE UR QL: NEGATIVE MG/DL — SIGNIFICANT CHANGE UP
HCG SERPL-ACNC: <4 MIU/ML — SIGNIFICANT CHANGE UP
HCT VFR BLD CALC: 37.2 % — SIGNIFICANT CHANGE UP (ref 34.5–45)
HGB BLD-MCNC: 12.3 G/DL — SIGNIFICANT CHANGE UP (ref 11.5–15.5)
IMM GRANULOCYTES NFR BLD AUTO: 0.2 % — SIGNIFICANT CHANGE UP (ref 0–0.9)
KETONES UR-MCNC: NEGATIVE MG/DL — SIGNIFICANT CHANGE UP
LEUKOCYTE ESTERASE UR-ACNC: NEGATIVE — SIGNIFICANT CHANGE UP
LYMPHOCYTES # BLD AUTO: 1.78 K/UL — SIGNIFICANT CHANGE UP (ref 1–3.3)
LYMPHOCYTES # BLD AUTO: 27.9 % — SIGNIFICANT CHANGE UP (ref 13–44)
MCHC RBC-ENTMCNC: 30.4 PG — SIGNIFICANT CHANGE UP (ref 27–34)
MCHC RBC-ENTMCNC: 33.1 GM/DL — SIGNIFICANT CHANGE UP (ref 32–36)
MCV RBC AUTO: 92.1 FL — SIGNIFICANT CHANGE UP (ref 80–100)
METHADONE UR-MCNC: NEGATIVE — SIGNIFICANT CHANGE UP
MONOCYTES # BLD AUTO: 0.44 K/UL — SIGNIFICANT CHANGE UP (ref 0–0.9)
MONOCYTES NFR BLD AUTO: 6.9 % — SIGNIFICANT CHANGE UP (ref 2–14)
NEUTROPHILS # BLD AUTO: 4.04 K/UL — SIGNIFICANT CHANGE UP (ref 1.8–7.4)
NEUTROPHILS NFR BLD AUTO: 63.1 % — SIGNIFICANT CHANGE UP (ref 43–77)
NITRITE UR-MCNC: NEGATIVE — SIGNIFICANT CHANGE UP
OPIATES UR-MCNC: NEGATIVE — SIGNIFICANT CHANGE UP
PCP SPEC-MCNC: SIGNIFICANT CHANGE UP
PCP UR-MCNC: NEGATIVE — SIGNIFICANT CHANGE UP
PH UR: 7 — SIGNIFICANT CHANGE UP (ref 5–8)
PLATELET # BLD AUTO: 281 K/UL — SIGNIFICANT CHANGE UP (ref 150–400)
POTASSIUM SERPL-MCNC: 3.8 MMOL/L — SIGNIFICANT CHANGE UP (ref 3.5–5.3)
POTASSIUM SERPL-SCNC: 3.8 MMOL/L — SIGNIFICANT CHANGE UP (ref 3.5–5.3)
PROT SERPL-MCNC: 8.2 G/DL — SIGNIFICANT CHANGE UP (ref 6.6–8.7)
PROT UR-MCNC: NEGATIVE MG/DL — SIGNIFICANT CHANGE UP
RBC # BLD: 4.04 M/UL — SIGNIFICANT CHANGE UP (ref 3.8–5.2)
RBC # FLD: 12.6 % — SIGNIFICANT CHANGE UP (ref 10.3–14.5)
RBC CASTS # UR COMP ASSIST: 4 /HPF — SIGNIFICANT CHANGE UP (ref 0–4)
RSV RNA NPH QL NAA+NON-PROBE: SIGNIFICANT CHANGE UP
SALICYLATES SERPL-MCNC: <0.6 MG/DL — LOW (ref 10–20)
SARS-COV-2 RNA SPEC QL NAA+PROBE: SIGNIFICANT CHANGE UP
SODIUM SERPL-SCNC: 137 MMOL/L — SIGNIFICANT CHANGE UP (ref 135–145)
SP GR SPEC: 1.02 — SIGNIFICANT CHANGE UP (ref 1–1.03)
SQUAMOUS # UR AUTO: 9 /HPF — HIGH (ref 0–5)
THC UR QL: NEGATIVE — SIGNIFICANT CHANGE UP
UROBILINOGEN FLD QL: 1 MG/DL — SIGNIFICANT CHANGE UP (ref 0.2–1)
WBC # BLD: 6.39 K/UL — SIGNIFICANT CHANGE UP (ref 3.8–10.5)
WBC # FLD AUTO: 6.39 K/UL — SIGNIFICANT CHANGE UP (ref 3.8–10.5)
WBC UR QL: 1 /HPF — SIGNIFICANT CHANGE UP (ref 0–5)

## 2024-10-15 PROCEDURE — 85025 COMPLETE CBC W/AUTO DIFF WBC: CPT

## 2024-10-15 PROCEDURE — 93010 ELECTROCARDIOGRAM REPORT: CPT

## 2024-10-15 PROCEDURE — 99285 EMERGENCY DEPT VISIT HI MDM: CPT

## 2024-10-15 PROCEDURE — 93005 ELECTROCARDIOGRAM TRACING: CPT

## 2024-10-15 PROCEDURE — 80053 COMPREHEN METABOLIC PANEL: CPT

## 2024-10-15 PROCEDURE — 87637 SARSCOV2&INF A&B&RSV AMP PRB: CPT

## 2024-10-15 PROCEDURE — 99284 EMERGENCY DEPT VISIT MOD MDM: CPT | Mod: 25

## 2024-10-15 PROCEDURE — T1013: CPT

## 2024-10-15 PROCEDURE — 90792 PSYCH DIAG EVAL W/MED SRVCS: CPT | Mod: 95

## 2024-10-15 PROCEDURE — 81001 URINALYSIS AUTO W/SCOPE: CPT

## 2024-10-15 PROCEDURE — 80307 DRUG TEST PRSMV CHEM ANLYZR: CPT

## 2024-10-15 PROCEDURE — 36415 COLL VENOUS BLD VENIPUNCTURE: CPT

## 2024-10-15 PROCEDURE — 84702 CHORIONIC GONADOTROPIN TEST: CPT

## 2024-10-15 RX ORDER — ACETAMINOPHEN 325 MG
650 TABLET ORAL ONCE
Refills: 0 | Status: COMPLETED | OUTPATIENT
Start: 2024-10-15 | End: 2024-10-15

## 2024-10-15 RX ADMIN — Medication 650 MILLIGRAM(S): at 22:04

## 2024-10-15 RX ADMIN — Medication 650 MILLIGRAM(S): at 21:58
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
## 2024-10-15 NOTE — ED BEHAVIORAL HEALTH ASSESSMENT NOTE - OTHER PAST PSYCHIATRIC HISTORY (INCLUDE DETAILS REGARDING ONSET, COURSE OF ILLNESS, INPATIENT/OUTPATIENT TREATMENT)
No admits, only tx was at age 40 being in psychotherapy in [facility] which she found helpful. No h/o psych meds.

## 2024-10-15 NOTE — ED ADULT NURSE REASSESSMENT NOTE - NS ED NURSE REASSESS COMMENT FT1
pt received medically cleared by the ed attending for transfer to . pt interviewed with ed  Melvin. pt is a&o x3 c/o not feeling well and depression. pt states that she felt suicidal today with plan to over dose on pills. pt reports that she feels overwhelmed. pt denies auditory or visual hallucinations. pt was screened by security for weapons or contraband. pt was oriented to the unit.

## 2024-10-15 NOTE — ED ADULT TRIAGE NOTE - HEIGHT IN INCHES
[address] PRIMARY CARE  [facility] [address] [facility] [address]  [address] [address]  Dept: [phone]  Dept Fax: [phone]    Sammy Aguila is a 45 y.o. female who presentstoday for her medical conditions/complaints as noted below. Sammy Aguila is c/o of  Chief Complaint   Patient presents with    Back Pain     neck tightness    Hypertension     dcd  BP meds x1 wk due to dizziness         HPI:     Here for acute c/o intermittent upper back/neck pain. Ongoing, is attempting to alternate exercise routine to identify specific triggers. She is getting routine massages to relieve sore muscles but feels her tension is extending further down her back. Denies injury, no numbness, tingling or weakness to upper extremities. Has used otc ibuprofen, alternating heat/ice, minimal relief. Pain is sharp and shooting, would like to try gabapentin. Has weaned self off lisinopril d/t persistent dizziness despite increased PO fluid intake. Had cut down to 5 mg after previous office visit and sx persisted. Feels dizziness has since resolved w/out any medication but BP is elevated. Discussed alternate medication and she is agreeable to try.  Denies CP, SOB, leg swelling or HA      No results found for: LABA1C          ( goal A1C is < 7)   No results found for: LABMICR  LDL Cholesterol (mg/dL)   Date Value   2021 136 (H)   2019 129   2018 121       (goal LDL is <100)   AST (U/L)   Date Value   2021 24     ALT (U/L)   Date Value   2021 19     BUN (mg/dL)   Date Value   2021 12     BP Readings from Last 3 Encounters:   22 (!) 140/90   22 114/74   21 128/70          ([id number])    Past Medical History:   Diagnosis Date    Fatigue     Hemorrhoid       Past Surgical History:   Procedure Laterality Date    COLONOSCOPY  4-1-16    hemorrhoids    DILATION AND CURETTAGE OF UTERUS N/A 2021    HYSTEROSCOPY, DILATATION AND CURETTAGE WITH Kenrick Farley [facility]     [facility] SURGERY  10/14/2016    hemorrhoidectomy    HYSTEROSCOPY N/A 09/21/2021    HYSTEROSCOPY, DILATATION AND CURETTAGE WITH MYOSURE (N/A Vagina     LASIK Bilateral     TYMPANOSTOMY TUBE PLACEMENT      x 3 school age       Family History   Problem Relation Age of Onset    High Blood Pressure Father     Schizophrenia Father     OCD Father     Macular Degen Maternal Grandmother     Colon Cancer Maternal Grandfather     Heart Attack Maternal Grandfather     Stroke Paternal Grandmother     Breast Cancer Paternal Grandmother     Heart Attack Paternal Grandfather        Social History     Tobacco Use    Smoking status: Never Smoker    Smokeless tobacco: Never Used   Substance Use Topics    Alcohol use: Yes     Comment: sociably/ not daily      Current Outpatient Medications   Medication Sig Dispense Refill    amLODIPine (NORVASC) 2.5 MG tablet Take 1 tablet by mouth daily 30 tablet 1    gabapentin (NEURONTIN) 100 MG capsule Take 1 capsule by mouth nightly for 180 days. Intended supply: 90 days 30 capsule 1    sertraline (ZOLOFT) 25 MG tablet Take 25 mg tablet daily. 90 tablet 1     No current facility-administered medications for this visit.      No Known Allergies    Health Maintenance   Topic Date Due    Pneumococcal 0-64 years Vaccine (1 of 2 - PPSV23) Never done    DTaP/Tdap/Td vaccine (6 - Tdap) 12/15/2000    Diabetes screen  Never done    COVID-19 Vaccine (2 - Booster for Janette series) 05/04/2021    Potassium monitoring  09/14/2022    Creatinine monitoring  09/14/2022    Depression Screen  02/01/2023    Cervical cancer screen  05/18/2026    Lipid screen  08/03/2026    Flu vaccine  Completed    Hepatitis A vaccine  Aged Out    Hepatitis B vaccine  Aged Out    Hib vaccine  Aged Out    Meningococcal (ACWY) vaccine  Aged Out    Hepatitis C screen  Discontinued    HIV screen Discontinued       Subjective:      Review of Systems   Constitutional: Negative for activity change, fatigue and fever. HENT: Negative for congestion, rhinorrhea and sore throat. Eyes: Negative for visual disturbance. Respiratory: Negative for chest tightness and shortness of breath. Cardiovascular: Negative for chest pain and palpitations. Gastrointestinal: Negative for abdominal pain, diarrhea, nausea and vomiting. Endocrine: Negative for polydipsia. Genitourinary: Negative for difficulty urinating. Musculoskeletal: Positive for back pain and neck stiffness. Negative for arthralgias, myalgias and neck pain. Skin: Negative for color change. Neurological: Negative for weakness and headaches. Psychiatric/Behavioral: Negative for behavioral problems. The patient is not nervous/anxious. All other systems reviewed and are negative. Objective:   BP (!) 140/90   Pulse 81   Resp 17   Wt 200 lb (90.7 kg)   SpO2 95%   BMI 28.70 kg/m²   Physical Exam  Vitals reviewed. Constitutional:       General: She is not in acute distress. Appearance: Normal appearance. HENT:      Head: Normocephalic. Eyes:      Pupils: Pupils are equal, round, and reactive to light. Cardiovascular:      Rate and Rhythm: Normal rate and regular rhythm. Pulses: Normal pulses. Heart sounds: Normal heart sounds. Pulmonary:      Effort: Pulmonary effort is normal.      Breath sounds: Normal breath sounds. Abdominal:      General: There is no distension. Musculoskeletal:         General: Normal range of motion. Cervical back: Neck supple. Right lower leg: No edema. Left lower leg: No edema. Skin:     General: Skin is warm and dry. Capillary Refill: Capillary refill takes less than 2 seconds. Neurological:      General: No focal deficit present. Mental Status: She is alert and oriented to person, place, and time.    Psychiatric:         Mood and Affect: Mood normal. Behavior: Behavior normal.           :       Diagnosis Orders   1. Primary hypertension  amLODIPine (NORVASC) 2.5 MG tablet   2. Neck tightness  gabapentin (NEURONTIN) 100 MG capsule   3. Back tightness  gabapentin (NEURONTIN) 100 MG capsule             :          1. Primary hypertension  Not controlled and adverse side effects from lisinopril, will start norvasc 2.5mg and recheck in 6 weeks or sooner if needed. Continue low salt diet and exercise. - amLODIPine (NORVASC) 2.5 MG tablet; Take 1 tablet by mouth daily  Dispense: 30 tablet; Refill: 1    2. Neck tightness  3. Back tightness  Waxing and waning- trial gabapentin nightly PRN to rule out nerve impingement. Continue antiinflammatory, massage and supportive care at home as needed. - gabapentin (NEURONTIN) 100 MG capsule; Take 1 capsule by mouth nightly for 180 days. Intended supply: 90 days  Dispense: 30 capsule; Refill: 1      Return in about 6 weeks (around 3/15/2022) for Hypertension, back pain. Patient given educational materials - see patient instructions. Discussed use, benefit, and side effects of prescribed medications. All patient questions answered. Pt voiced understanding. Reviewed health maintenance. Instructed to continue current medications, diet and exercise. Patient agreed with treatment plan. Follow up as directed.        Electronicallysigned by LENNY Balderrama CNP on 2/3/2022 at 2:09 PM 5
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
## 2024-10-15 NOTE — ED ADULT TRIAGE NOTE - RESPIRATORY RATE (BREATHS/MIN)
Compression dressing removed, no longer bleeding, guaze and tegaderm applied. Wound care instructions given.   20
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
## 2024-10-15 NOTE — ED ADULT TRIAGE NOTE - CHIEF COMPLAINT QUOTE
pt arrived to ED wanting a psychiatric evaluation, states she "has been  having a lot of issues since she got to this country and has thoughts of killing myself," plan is to take pills, states that she has tried to drown herself in the past, denies N/V/D/CP/SOB/auditory and visual hallucinations, takes Melatonin to sleep

## 2024-10-15 NOTE — ED BEHAVIORAL HEALTH ASSESSMENT NOTE - NS ED BHA TELEPSYCH PATIENT LOCATION
[facility] Metronidazole Pregnancy And Lactation Text: This medication is Pregnancy Category B and considered safe during pregnancy.  It is also excreted in breast milk.

## 2024-10-15 NOTE — ED STATDOCS - PHYSICAL EXAMINATION
VITAL SIGNS: I have reviewed nursing notes and confirm.  CONSTITUTIONAL:  in no acute distress.  SKIN: Skin exam is warm and dry, no acute rash.  HEAD: Normocephalic; atraumatic.  EYES: PERRL, EOM intact; conjunctiva and sclera clear.  ENT: No nasal discharge; airway clear. Throat clear.  NECK: Supple; non tender.    CARD: Regular rate and rhythm.  RESP: No wheezes,  no rales or rhonchi.   ABD:  soft; non-distended; non-tender;   EXT: Normal ROM. No clubbing, cyanosis or edema.  NEURO: Alert, oriented. Grossly unremarkable. No focal deficits.  moves all extremities,  normal gait   PSYCH:  (+) tearful. Cooperative,
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
## 2024-10-15 NOTE — ED BEHAVIORAL HEALTH ASSESSMENT NOTE - REFERRAL / APPOINTMENT DETAILS
Given referral info for outpt therapy near [facility].

## 2024-10-15 NOTE — ED STATDOCS - NSFOLLOWUPINSTRUCTIONS_ED_ALL_ED_FT
1) Keyur un seguimiento con nash médico de atención primaria en los próximos 5 a 7 días.  Por favor llame mañana para betty abiola.  Si no puede realizar un seguimiento con nash médico de atención primaria, regrese al servicio de urgencias si tiene algún problema urgente.  2) Se le entregó betty copia de las pruebas realizadas hoy.  Traiga los resultados con usted y revíselos con nash médico de atención primaria.  3) Si manda síntomas empeoran o tiene alguna otra inquietud, regrese al servicio de urgencias de inmediato.  4) Continúe tomando manda medicamentos caseros según las indicaciones.    Si los síntomas empeoran, comuníquese con    [facility]  [address], [place] [id number] [phone]

## 2024-10-15 NOTE — ED STATDOCS - NS ED ROS FT
CONSTITUTIONAL - no  fever, no diaphoresis, no weight change  SKIN - no rash  HEMATOLOGIC - no bleeding, no bruising  EYES - no eye pain, no blurred vision  ENT - no change in hearing, no pain  RESPIRATORY - no shortness of breath, no cough  CARDIAC - no chest pain, no palpitations  GI - no abd pain, no nausea, no vomiting, no diarrhea, no constipation, no bleeding  GENITO-URINARY - no discharge, no dysuria; no hematuria,   ENDO - no polydipsia, no polyuria, no heat/no cold intolerance  MUSCULOSKELETAL - no joint pain, no swelling, no redness  NEUROLOGIC - no weakness, (+) headache, no anesthesia, no paresthesias  PSYCH - no anxiety,  (+)  suicidal, non homicidal, no hallucination, no depression

## 2024-10-15 NOTE — ED STATDOCS - PATIENT PORTAL LINK FT
You can access the FollowMyHealth Patient Portal offered by [facility] by registering at the following website: [email or web address]. By joining [facility] [employer]’s FollowMyHealth portal, you will also be able to view your health information using other applications (apps) compatible with our system.
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
## 2025-01-10 NOTE — ED ADULT NURSE NOTE - PATIENT/CAREGIVER ACCEPTED INTERPRETER SERVICES
Orders Placed This Encounter   Procedures    Home Sleep Study     Standing Status:   Future     Standing Expiration Date:   1/10/2026     Order Specific Question:   Location For Sleep Study     Answer:   [place]     Order Specific Question:   Select Sleep Lab Location     Answer:   [facility]         yes